# Patient Record
Sex: FEMALE | Race: WHITE | NOT HISPANIC OR LATINO | Employment: STUDENT | URBAN - METROPOLITAN AREA
[De-identification: names, ages, dates, MRNs, and addresses within clinical notes are randomized per-mention and may not be internally consistent; named-entity substitution may affect disease eponyms.]

---
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
## 2024-08-26 ENCOUNTER — ANNUAL EXAM (OUTPATIENT)
Dept: GYNECOLOGY | Facility: CLINIC | Age: 19
End: 2024-08-26
Payer: COMMERCIAL

## 2024-08-26 VITALS
BODY MASS INDEX: 23.72 KG/M2 | WEIGHT: 147.6 LBS | DIASTOLIC BLOOD PRESSURE: 82 MMHG | HEIGHT: 66 IN | SYSTOLIC BLOOD PRESSURE: 124 MMHG

## 2024-08-26 DIAGNOSIS — Z01.419 WOMEN'S ANNUAL ROUTINE GYNECOLOGICAL EXAMINATION: Primary | ICD-10-CM

## 2024-08-26 PROCEDURE — S0612 ANNUAL GYNECOLOGICAL EXAMINA: HCPCS | Performed by: OBSTETRICS & GYNECOLOGY

## 2024-08-26 NOTE — PROGRESS NOTES
"Assessment   Annual well woman exam  No new GYN concerns or complaints  Declines contraception  STD testing not indicated        Plan    All questions answered.  Breast self exam technique reviewed and patient encouraged to perform self-exam monthly.  Discussed healthy lifestyle modifications.     Subjective here for annual exam     April Rodriguez is a 19 y.o. female who presents for annual exam. Periods are regular every 28-30 days, lasting 5 days. Dysmenorrhea:none. Cyclic symptoms include none. No intermenstrual bleeding, spotting, or discharge.  Not currently sexually active, previously in a same-sex relationship.The patient reports that there is not domestic violence in her life.     Current contraception: none  History of abnormal Pap smear: no  Family history of uterine or ovarian cancer: no  Regular self breast exam: yes  History of abnormal mammogram: no  Family history of breast cancer: no  History of abnormal lipids: no  Menstrual History:  OB History    No obstetric history on file.        Menarche age: 10  Patient's last menstrual period was 08/08/2024 (exact date).   Review of Systems  Pertinent items are noted in HPI.      Objective no acute distress   /82 (BP Location: Left arm, Patient Position: Sitting, Cuff Size: Standard)   Ht 5' 6\" (1.676 m)   Wt 67 kg (147 lb 9.6 oz)   LMP 08/08/2024 (Exact Date)   BMI 23.82 kg/m²     General:   alert and oriented, in no acute distress, alert, appears stated age, and cooperative   Heart: regular rate and rhythm, S1, S2 normal, no murmur, click, rub or gallop   Lungs: clear to auscultation bilaterally   Abdomen: soft, non-tender, without masses or organomegaly   Vulva: normal, Bartholin's, Urethra, Norman's normal, female escutcheon   Vagina: normal mucosa, normal discharge, no palpable nodules   Cervix: anteverted, no cervical motion tenderness, no lesions, nulliparous appearance, and Pap test deferred   Uterus: normal size, anteverted, mobile, " non-tender, normal shape and consistency   Adnexa: normal adnexa and no mass, fullness, tenderness   Bilateral breast exam in the sitting and supine position with chaperone present, no visible or palpable breast lesions identified.  No breast masses noted.    No supraclavicular or axillary lymphadenopathy noted.  No nipple discharge.   Reviewed self-breast exam techniques.   Rectal exam,  deferred